# Patient Record
Sex: FEMALE | Race: WHITE | Employment: OTHER | URBAN - METROPOLITAN AREA
[De-identification: names, ages, dates, MRNs, and addresses within clinical notes are randomized per-mention and may not be internally consistent; named-entity substitution may affect disease eponyms.]

---

## 2017-11-13 ENCOUNTER — APPOINTMENT (OUTPATIENT)
Dept: GENERAL RADIOLOGY | Age: 82
End: 2017-11-13
Attending: EMERGENCY MEDICINE
Payer: MEDICARE

## 2017-11-13 ENCOUNTER — HOSPITAL ENCOUNTER (EMERGENCY)
Age: 82
Discharge: HOME | End: 2017-11-13
Attending: EMERGENCY MEDICINE
Payer: MEDICARE

## 2017-11-13 VITALS
HEART RATE: 64 BPM | SYSTOLIC BLOOD PRESSURE: 180 MMHG | OXYGEN SATURATION: 99 % | DIASTOLIC BLOOD PRESSURE: 72 MMHG | WEIGHT: 100 LBS | RESPIRATION RATE: 16 BRPM | HEIGHT: 61 IN | BODY MASS INDEX: 18.88 KG/M2 | TEMPERATURE: 98 F

## 2017-11-13 DIAGNOSIS — M25.552 PAIN OF BOTH HIP JOINTS: Primary | ICD-10-CM

## 2017-11-13 DIAGNOSIS — M25.551 PAIN OF BOTH HIP JOINTS: Primary | ICD-10-CM

## 2017-11-13 PROCEDURE — 72170 X-RAY EXAM OF PELVIS: CPT

## 2017-11-13 PROCEDURE — 72100 X-RAY EXAM L-S SPINE 2/3 VWS: CPT

## 2017-11-13 PROCEDURE — 99283 EMERGENCY DEPT VISIT LOW MDM: CPT | Performed by: EMERGENCY MEDICINE

## 2017-11-13 RX ORDER — TRAMADOL HYDROCHLORIDE 50 MG/1
50 TABLET ORAL 2 TIMES DAILY
Qty: 11 TAB | Refills: 0 | Status: SHIPPED | OUTPATIENT
Start: 2017-11-13

## 2017-11-13 NOTE — DISCHARGE INSTRUCTIONS
Return with any fevers, vomiting, altered breathing, worsening symptoms, or additional concerns. Follow-up with your primary care doctor for reevaluation.

## 2017-11-13 NOTE — ED TRIAGE NOTES
Pt reports acute on chronic left hip and back pain , worse over the last 3 weeks. Has not seen her PCP for it yet.

## 2017-11-13 NOTE — ED PROVIDER NOTES
HPI Comments: 45-year-old lady with a history of pain in her hips and back that of gotten worse over the last week or 2. Patient's son says that normally she is mobile with walker but she has now started to say it hurts when she does that. She is still getting up with her walker and moving around she says it is more painful. She has not had any falls that they're aware of. Patient does have some short-term memory loss. Elements of this note were created using speech recognition software. As such, errors of speech recognition may be present. Patient is a 80 y.o. female presenting with hip pain. The history is provided by the patient and a relative. Hip Pain    Associated symptoms include back pain. Past Medical History:   Diagnosis Date    Chronic anemia     Glaucoma     left eye, managed with drops    Heart failure ([address]     managed with medication     History of shingles     managed with medication     Hypertension     managed with medication     Hypothyroidism     managed with medication     Osteoarthritis     Osteoporosis     managed with medication        Past Surgical History:   Procedure Laterality Date    HX ANKLE FRACTURE TX Left     repair    HX CATARACT REMOVAL Bilateral     HX HIP REPLACEMENT Bilateral     HX KYPHOPLASTY      HX TONSIL AND ADENOIDECTOMY           Family History:   Problem Relation Age of Onset    Cancer Father     Cancer Sister     Heart Disease Brother     Heart Disease Sister     Hypertension Sister     Cancer Brother        Social History     Social History    Marital status:      Spouse name: N/A    Number of children: N/A    Years of education: N/A     Occupational History    Not on file.      Social History Main Topics    Smoking status: Never Smoker    Smokeless tobacco: Never Used    Alcohol use No    Drug use: No    Sexual activity: No     Other Topics Concern    Not on file     Social History Narrative         ALLERGIES: Morphine    Review of Systems   Constitutional: Negative for chills and fever. Musculoskeletal: Positive for arthralgias and back pain. Negative for joint swelling. Skin: Negative for color change and wound. Vitals:    11/13/17 1406   BP: 184/76   Pulse: 60   Resp: 18   Temp: 97.9 °F (36.6 °C)   SpO2: 98%   Weight: 45.4 kg (100 lb)   Height: 5' 1\" (1.549 m)            Physical Exam   Constitutional: She is oriented to person, place, and time. She appears well-developed and well-nourished. Musculoskeletal:   No obvious deformity. Patient is able to stand and bear weight on both legs. Neurological: She is alert and oriented to person, place, and time. Nursing note and vitals reviewed. MDM  Number of Diagnoses or Management Options  Diagnosis management comments: X-rays are unremarkable. I will discharge her home.     ED Course       Procedures

## 2017-11-13 NOTE — ED NOTES
I have reviewed discharge instructions with the patient. The patient verbalized understanding. Patient left ED via Discharge Method: wheelchair to Home with (insert name of family/friend, self, transport via son). Opportunity for questions and clarification provided. Patient given 1 scripts. To continue your aftercare when you leave the hospital, you may receive an automated call from our care team to check in on how you are doing. This is a free service and part of our promise to provide the best care and service to meet your aftercare needs.  If you have questions, or wish to unsubscribe from this service please call [phone]. Thank you for Choosing our [facility].